# Patient Record
(demographics unavailable — no encounter records)

---

## 2024-11-15 NOTE — ASSESSMENT
[FreeTextEntry1] : Data reviewed:  Spirometry 1/8/16: Mild airflow obstruction, ratio 67%, FEV1 74% / FENO 64 [place] 2/5/16: no change, mild EAO ratio 62% FEV1 74% / FENO 19 [place] 10/13/17: mild obstruction, FEV1 69% [place] 4/4/19: mild restriction, FEV1 71% Sd 9/2/20: restricted, FEV1 65% FENO 11/15/2024: 8  Impression: Asthma, controlled One exacerbation in 2023  Plan: Maintain Arnuity 50. UTD on vax. RTO annually.

## 2024-11-15 NOTE — HISTORY OF PRESENT ILLNESS
[Never] : never [TextBox_4] : My patient since 2016, a never smoker w asthma since the age of 30, managed w [facility]CS. Hospitalized at [facility] for UTI w sepsis in 12/2020.   12/1/2023: Has stayed on her Flovent, doing well, until about a month ago had an exacerbation [place] w prednisone and [place] [facility] [place] now recovered from this. Had been at a dinner party and other people there got sick as well. Continues to work from home mostly; may retire next year.  11/15/2024: Plans to retire end Dec, plans to travel, stay active, is very social. Had Covid Oct 2024, took Paxlovid. Recovered. Changed in interim to Arnuity, which she occasionally forgets but takes most days. No interval exacerbations. No serious new health problems. Having eval for some coordination issues, doing PT for this.

## 2024-11-19 NOTE — HEALTH RISK ASSESSMENT
[Good] : ~his/her~  mood as  good [Yes] : Yes [Monthly or less (1 pt)] : Monthly or less (1 point) [1 or 2 (0 pts)] : 1 or 2 (0 points) [Never (0 pts)] : Never (0 points) [No] : In the past 12 months have you used drugs other than those required for medical reasons? No [No falls in past year] : Patient reported no falls in the past year [0] : 2) Feeling down, depressed, or hopeless: Not at all (0) [PHQ-2 Negative - No further assessment needed] : PHQ-2 Negative - No further assessment needed [Audit-CScore] : 1 [[id number]] : 0 [Never] : Never [Patient reported mammogram was normal] : Patient reported mammogram was normal [Patient reported bone density results were normal] : Patient reported bone density results were normal [Patient reported colonoscopy was normal] : Patient reported colonoscopy was normal [HIV test declined] : HIV test declined [Hepatitis C test declined] : Hepatitis C test declined [Change in mental status noted] : No change in mental status noted [Employed] : employed [] :  [Sexually Active] : not sexually active [Fully functional (bathing, dressing, toileting, transferring, walking, feeding)] : Fully functional (bathing, dressing, toileting, transferring, walking, feeding) [Fully functional (using the telephone, shopping, preparing meals, housekeeping, doing laundry, using] : Fully functional and needs no help or supervision to perform IADLs (using the telephone, shopping, preparing meals, housekeeping, doing laundry, using transportation, managing medications and managing finances) [Reports changes in hearing] : Reports no changes in hearing [Reports changes in vision] : Reports no changes in vision [Reports normal functional visual acuity (ie: able to read med bottle)] : Reports normal functional visual acuity [Reports changes in dental health] : Reports no changes in dental health [Seat Belt] :  uses seat belt [Sunscreen] : uses sunscreen [TB Exposure] : is not being exposed to tuberculosis [MammogramDate] : 12/2022 [BoneDensityDate] : 11/2020 [ColonoscopyDate] : 6/2015 [ColonoscopyComments] : Patient promises to get it next year. [de-identified] : Brother [Patient/Caregiver not ready to engage] : , patient/caregiver not ready to engage [AdvancecareDate] : 10/2023

## 2024-11-19 NOTE — ASSESSMENT
[FreeTextEntry1] : Health Maintenance Daily aerobic exercises strongly recommended. No STD risk or substance abuse per patient report. Occasional gender specific self-examination is suggested.  A alternative-patient Patient states she will continue to get yearly mammography and plans to see her gynecologist every 3 or 4 years DEXA scan in 2020 showed normal bone density.  Recommend follow-up next year No depression. Competent with ADLs. Serial surveillance colonoscopy follow-up recommended next year Completed primary COVID-vaccine series with monovalent and polyvalent boosters and already received new variant specific COVID-vaccine last month. Has completed pneumococcal (PCV-20) and shingles vaccine series.   Received high-dose flu vaccine last month.  Obesity. Has had a 5 pound weight gain over the past year, now 185 with BMI of 33.  An additional 20 to 25 pound weight loss is recommended. 10-15 minute discussion regarding obesity-associated morbidities including heart disease, hypertension, diabetes, knee arthritis, fatty liver, and others. Patient indicates awareness. Lifestyle approaches to achieving desired weight loss were reviewed in detail including structured low-calorie diet and daily aerobic exercise as tolerated  Discussed option of treatment with GLP-1 (which would also serve as alternative treatment for T2DM instead of current metformin).  Patient questions answered and she will research further.  HTN BP taken x2 in office today. 136/73.  133/72. Elevated as compared to 1 year ago when BP was 111/64.  Suspect may be due to interval weight gain. Explained the rationale (to reduce vascular and other complications) as well as the goal (BP under 135/90) for management of HTN. Her current BP is borderline elevated despite treatment with olmesartan 20 mg. If weight related, should improve with anticipated weight loss (see above). May also simply be labile fluctuation.  In any case, will not change current management. Follow-up in 3 to 4 months.  T2DM. Compliant with Metformin 500 mg twice daily without adverse effects. REVIEWED serial hemoglobin A1c studies between 2020 and 2022.  Was 7.4 on 8/2020 and 5.9 on 8/2021.  Further reduced to 5.8 in 2022. Hemoglobin A1c sent today.  Will discuss further when results are available including option of changing from metformin to Ozempic.

## 2024-11-19 NOTE — PHYSICAL EXAM
[Well Nourished] : well nourished [Well Developed] : well developed [Well-Appearing] : well-appearing [Normal Sclera/Conjunctiva] : normal sclera/conjunctiva [PERRL] : pupils equal round and reactive to light [EOMI] : extraocular movements intact [Normal Outer Ear/Nose] : the outer ears and nose were normal in appearance [Normal Oropharynx] : the oropharynx was normal [No JVD] : no jugular venous distention [No Lymphadenopathy] : no lymphadenopathy [Supple] : supple [Thyroid Normal, No Nodules] : the thyroid was normal and there were no nodules present [No Respiratory Distress] : no respiratory distress  [No Accessory Muscle Use] : no accessory muscle use [Clear to Auscultation] : lungs were clear to auscultation bilaterally [Normal Rate] : normal rate  [Regular Rhythm] : with a regular rhythm [Normal S1, S2] : normal S1 and S2 [No Murmur] : no murmur heard [No Carotid Bruits] : no carotid bruits [No Varicosities] : no varicosities [No Edema] : there was no peripheral edema [No Extremity Clubbing/Cyanosis] : no extremity clubbing/cyanosis [Soft] : abdomen soft [Non Tender] : non-tender [Non-distended] : non-distended [No Masses] : no abdominal mass palpated [No HSM] : no HSM [Normal Bowel Sounds] : normal bowel sounds [No CVA Tenderness] : no CVA  tenderness [No Spinal Tenderness] : no spinal tenderness [No Joint Swelling] : no joint swelling [Grossly Normal Strength/Tone] : grossly normal strength/tone [No Rash] : no rash [Coordination Grossly Intact] : coordination grossly intact [No Focal Deficits] : no focal deficits [Normal Gait] : normal gait [Normal Affect] : the affect was normal [Normal Insight/Judgement] : insight and judgment were intact

## 2024-11-19 NOTE — HEALTH RISK ASSESSMENT
[Good] : ~his/her~  mood as  good [Yes] : Yes [Monthly or less (1 pt)] : Monthly or less (1 point) [1 or 2 (0 pts)] : 1 or 2 (0 points) [Never (0 pts)] : Never (0 points) [No] : In the past 12 months have you used drugs other than those required for medical reasons? No [No falls in past year] : Patient reported no falls in the past year [0] : 2) Feeling down, depressed, or hopeless: Not at all (0) [PHQ-2 Negative - No further assessment needed] : PHQ-2 Negative - No further assessment needed [Audit-CScore] : 1 [[id number]] : 0 [Never] : Never [Patient reported mammogram was normal] : Patient reported mammogram was normal [Patient reported bone density results were normal] : Patient reported bone density results were normal [Patient reported colonoscopy was normal] : Patient reported colonoscopy was normal [HIV test declined] : HIV test declined [Hepatitis C test declined] : Hepatitis C test declined [Change in mental status noted] : No change in mental status noted [Employed] : employed [] :  [Sexually Active] : not sexually active [Fully functional (bathing, dressing, toileting, transferring, walking, feeding)] : Fully functional (bathing, dressing, toileting, transferring, walking, feeding) [Fully functional (using the telephone, shopping, preparing meals, housekeeping, doing laundry, using] : Fully functional and needs no help or supervision to perform IADLs (using the telephone, shopping, preparing meals, housekeeping, doing laundry, using transportation, managing medications and managing finances) [Reports changes in hearing] : Reports no changes in hearing [Reports changes in vision] : Reports no changes in vision [Reports normal functional visual acuity (ie: able to read med bottle)] : Reports normal functional visual acuity [Reports changes in dental health] : Reports no changes in dental health [Seat Belt] :  uses seat belt [Sunscreen] : uses sunscreen [TB Exposure] : is not being exposed to tuberculosis [MammogramDate] : 12/2022 [BoneDensityDate] : 11/2020 [ColonoscopyDate] : 6/2015 [ColonoscopyComments] : Patient promises to get it next year. [de-identified] : Brother [Patient/Caregiver not ready to engage] : , patient/caregiver not ready to engage [AdvancecareDate] : 10/2023

## 2024-11-19 NOTE — HISTORY OF PRESENT ILLNESS
[FreeTextEntry1] : 71-year-old female on treatment for HTN, borderline T2DM, and asthma, with obesity and history of hospitalization for urosepsis in 12/2020, followed by urology, now returns for yearly CPE. Within the past 12 months, patient denies that she was hospitalized, had surgery, or was diagnosed with any new medical problem including COVID infection.

## 2025-04-17 NOTE — HISTORY OF PRESENT ILLNESS
[FreeTextEntry1] : Obesity/T2DM [de-identified] : Patient reports approximately a 10 pound weight loss since she started Ozempic 4 to 5 months ago.  She was on 0.25 mg dosage for most of this time increased to 0.5 mg dose for the past 3 weeks.  Tolerating medication extremely well without abdominal pain, nausea, vomiting, evidence of pancreatitis or cholangitis.  She remains on metformin 500 mg twice a day. She returns for follow-up blood work and to discuss ongoing management. She also complains of persistent nasal congestion following URI approximately 2 weeks ago.  Was treated with 5 days of prednisone with significant improvement but continues to have low-grade symptoms since then

## 2025-04-17 NOTE — ASSESSMENT
[FreeTextEntry1] : Obesity/T2DM Ozempic extremely well-tolerated at both 0.25 and 0.5 mg doses.  Patient states she is "ready and willing "to proceed to a higher dose at this time.  Prescription submitted to her pharmacy for Ozempic 1 mg / 4 mL with instructions for use.  Would anticipate at least another 15 to 20 pound weight loss over the next 4 to 5 months. Advised to contact me for any concerning symptomatology/side effects. Patient reminded that Ozempic also reduces muscle mass and that she must participate in whole body resistance training program to maintain as much muscle mass as possible.  She indicates understanding. Blood work sent for hemoglobin A1c and CMP today.  Depending on results, will likely decrease or possibly discontinue metformin dose.  Rhinitis Postviral v allergic Recommend nasal saline and steroids.  Instructions for use were explained in detail. May also use Mucinex.  Advised that, in absence of fever or purulence, antibiotics are not indicated.

## 2025-07-25 NOTE — ADDENDUM
[FreeTextEntry1] : A portion of this note was written by [Trever Castillo] on 07/23/2025 acting as a scribe for Dr. Jonas.   I have personally reviewed the chart and agree that the record accurately reflects my personal performance of the history, physical exam, assessment, and plan.

## 2025-07-25 NOTE — HISTORY OF PRESENT ILLNESS
[Home] : at home, [unfilled] , at the time of the visit. [Medical Office: ([facility])___] : at the medical office located in  [Telehealth (audio & video)] : This visit was provided via telehealth using real-time 2-way audio visual technology. [Verbal consent obtained from patient] : the patient, [unfilled] [FreeTextEntry1] : 2025 -- 72 F  (VD) with hx urosepsis. She presents for a telehealth f/u- discuss TVUS.   TVUS 25- thickened endometrium 0.6cm.  25- Triage call- pt was last seen 2024. She has a hx of urosepsis. She is c/o worsening pressure in the vagina on the right side since Wednesday. She went to urgent care Saturday. Urine culture was negative. She has no bothersome urinary symptoms + denies bleeding. She does not have a gynecologist currently.    2024 -- Pt is a 68 yo F  (VD) with hx urosepsis.  She continues Ellura daily for UTI prevention.  No infection-like symptoms today. Denies dysuria.  She reports 1-2 episodes of nocturia. Patient not bothered during the day. Denies urinary leakage- denies usage of pads.    PMH DM- sugar well controlled as per pt. No issues. Pt upcoming f/u appt with PCP in May.  note- patient retiring in .   3/11/24-- 7 day course of abx sent to pharmacy to treat positive culture 3/7/24 (>100,000 CFU/ml Escherichia coli). Patient felt better after abx.    23-- Ms. CECILIO NEWMAN comes in today for her urologic follow-up. Pt is a 69 year old  (vaginal) female with a Hx of urosepsis, presenting today for straight catheterization. Denies any UTI symptoms at this time.   22--Pt is a 69 year old  (vaginal) female with a Hx of urosepsis, presenting today for her 6 month follow-up. She is doing well and reports no recent urinary infections. She drinks cranberry juice and takes cranberry supplements regularly. Pt drops off urine samples for surveillance -most recent 3/22/22 was negative. Her DM is reportedly now managed well.   Recent Hx: In her last visit we discussed recent CT imaging results 2021 which was remarkable for patchy nephrographic defects in right kidney. No stones, nephrolithiasis, or hydronephrosis noted on imaging.  Today, she feels well and has no complaints. She continues to do intermittent urine drop-offs (which have all been negative), and tries to void q3-4 hours. Her DM is under tight control and reports losing 40 lbs, (intentional). She denies recent back pain and f/c.   Udip: negative  PVR: 11 cc (to rule out incomplete bladder emptying)  Habits: walks (uses a can for long distances), strict diet   Full Hx: Pt initially presented 2021 with complaints of a urinary tract infection. She was recently hospitalized due to a severe UTI infection and sepsis 12/3/2020. She stated this is the second hospitalization within 2 years. With both hospitalizations, she did not report pelvic symptoms characteristic of UTI. With first infection, she stated she experienced palpitations. With second infection, she reported fever, chills, and syncope consistent with sepsis. Following most recent hospitalization, she was given intravenous abx and rx Cefuroxime BID x 7days. Last dose abx was three weeks ago. She did report nocturia (x2) but stated that this is her baseline and has no current LUTS. No hx of renal stone. She underwent a renal US in the hospital but has not undergone a CT scan. Denied current gross hematuria, dysuria, back pain, fever, or chills. She stated she has been taking cranberry vitamins BID to prevent future infections. Of note, pt has recently been diagnosed with Type2 DM and is attempting weight loss.  Udip: (+) small leuks Urine cytology and culture 2021: negative Renal US 2020: unremarkable  Not sexually active PMH: Diabetes  SH: D and C (), removal of nasal polyps (), right shoulder replacement () FH: multiple myeloma (father), Parkinson disease (mother), kidney cancer (brother), other unspecified cancer, frequent UTIs (maternal), no hx renal stones  Social History: never smoker, monthly EtOH

## 2025-07-25 NOTE — ASSESSMENT
[FreeTextEntry1] : Reviewed TVUS 6/24/25- thickened endometrium 0.6cm. Patient c/o "right pelvic pain". Referred to GYN Dr. Garcia for further assessment/follow-up.  Patient expressed understanding.  20 min spent on this encounter.

## 2025-07-25 NOTE — HISTORY OF PRESENT ILLNESS
[Home] : at home, [unfilled] , at the time of the visit. [Medical Office: ([facility])___] : at the medical office located in  [Telehealth (audio & video)] : This visit was provided via telehealth using real-time 2-way audio visual technology. [Verbal consent obtained from patient] : the patient, [unfilled] [FreeTextEntry1] : 2025 -- 72 F  (VD) with hx urosepsis. She presents for a telehealth f/u- discuss TVUS.   TVUS 25- thickened endometrium 0.6cm.  25- Triage call- pt was last seen 2024. She has a hx of urosepsis. She is c/o worsening pressure in the vagina on the right side since Wednesday. She went to urgent care Saturday. Urine culture was negative. She has no bothersome urinary symptoms + denies bleeding. She does not have a gynecologist currently.    2024 -- Pt is a 70 yo F  (VD) with hx urosepsis.  She continues Ellura daily for UTI prevention.  No infection-like symptoms today. Denies dysuria.  She reports 1-2 episodes of nocturia. Patient not bothered during the day. Denies urinary leakage- denies usage of pads.    PMH DM- sugar well controlled as per pt. No issues. Pt upcoming f/u appt with PCP in May.  note- patient retiring in .   3/11/24-- 7 day course of abx sent to pharmacy to treat positive culture 3/7/24 (>100,000 CFU/ml Escherichia coli). Patient felt better after abx.    23-- Ms. CECILIO NEWMAN comes in today for her urologic follow-up. Pt is a 69 year old  (vaginal) female with a Hx of urosepsis, presenting today for straight catheterization. Denies any UTI symptoms at this time.   22--Pt is a 69 year old  (vaginal) female with a Hx of urosepsis, presenting today for her 6 month follow-up. She is doing well and reports no recent urinary infections. She drinks cranberry juice and takes cranberry supplements regularly. Pt drops off urine samples for surveillance -most recent 3/22/22 was negative. Her DM is reportedly now managed well.   Recent Hx: In her last visit we discussed recent CT imaging results 2021 which was remarkable for patchy nephrographic defects in right kidney. No stones, nephrolithiasis, or hydronephrosis noted on imaging.  Today, she feels well and has no complaints. She continues to do intermittent urine drop-offs (which have all been negative), and tries to void q3-4 hours. Her DM is under tight control and reports losing 40 lbs, (intentional). She denies recent back pain and f/c.   Udip: negative  PVR: 11 cc (to rule out incomplete bladder emptying)  Habits: walks (uses a can for long distances), strict diet   Full Hx: Pt initially presented 2021 with complaints of a urinary tract infection. She was recently hospitalized due to a severe UTI infection and sepsis 12/3/2020. She stated this is the second hospitalization within 2 years. With both hospitalizations, she did not report pelvic symptoms characteristic of UTI. With first infection, she stated she experienced palpitations. With second infection, she reported fever, chills, and syncope consistent with sepsis. Following most recent hospitalization, she was given intravenous abx and rx Cefuroxime BID x 7days. Last dose abx was three weeks ago. She did report nocturia (x2) but stated that this is her baseline and has no current LUTS. No hx of renal stone. She underwent a renal US in the hospital but has not undergone a CT scan. Denied current gross hematuria, dysuria, back pain, fever, or chills. She stated she has been taking cranberry vitamins BID to prevent future infections. Of note, pt has recently been diagnosed with Type2 DM and is attempting weight loss.  Udip: (+) small leuks Urine cytology and culture 2021: negative Renal US 2020: unremarkable  Not sexually active PMH: Diabetes  SH: D and C (), removal of nasal polyps (), right shoulder replacement () FH: multiple myeloma (father), Parkinson disease (mother), kidney cancer (brother), other unspecified cancer, frequent UTIs (maternal), no hx renal stones  Social History: never smoker, monthly EtOH